# Patient Record
(demographics unavailable — no encounter records)

---

## 2024-10-31 NOTE — HISTORY OF PRESENT ILLNESS
[FreeTextEntry1] : Scheduled for double cataract surgery w/ Dr. Javier Davis on November 11, 2024.  Here for presurgical clearance.  Doing well has no complaints.

## 2024-10-31 NOTE — PHYSICAL EXAM
[General Appearance - Alert] : alert [General Appearance - In No Acute Distress] : in no acute distress [Sclera] : the sclera and conjunctiva were normal [Hearing Threshold Finger Rub Not [place]] : hearing was normal [Jugular Venous Distention Increased] : there was no jugular-venous distention [Thyroid Diffuse Enlargement] : the thyroid was not enlarged [Auscultation Breath Sounds / Voice Sounds] : lungs were clear to auscultation bilaterally [Heart Sounds] : normal S1 and S2 [Murmurs] : no murmurs [Edema] : there was no peripheral edema [Abdomen Tenderness] : non-tender [Cervical Lymph Nodes Enlarged Posterior Bilaterally] : posterior cervical [Cervical Lymph Nodes Enlarged Anterior Bilaterally] : anterior cervical [FreeTextEntry1] : Mild right CVA tenderness [Abnormal Walk] : normal gait [] : no rash [No Focal Deficits] : no focal deficits [Oriented To Time, Place, And Person] : oriented to person, place, and time

## 2024-10-31 NOTE — RESULTS/DATA
[TextEntry] : EKG done today showed NSR.  The interpretation by the algorithm reads septal infarct.  This is due to the position of the leads.  The EKD is IDENTICAL to the one from 2020. No abnormalities.

## 2024-10-31 NOTE — ASSESSMENT
[FreeTextEntry1] : The patient is medically cleared for the upcoming bilateral cataract surgeries.  No medical contraindications.

## 2024-12-06 NOTE — ASSESSMENT
[FreeTextEntry1] : The patient is medically cleared for the second cataract surgery scheduled on December 9, 2024.  No medical contraindications.

## 2024-12-06 NOTE — HISTORY OF PRESENT ILLNESS
[FreeTextEntry1] : Scheduled for double cataract surgery w/ Dr. Javier Davis on December 9, 2024.  Here for presurgical clearance.  Doing well has no complaints.

## 2024-12-06 NOTE — PHYSICAL EXAM
[General Appearance - Alert] : alert [General Appearance - In No Acute Distress] : in no acute distress [Sclera] : the sclera and conjunctiva were normal [Hearing Threshold Finger Rub Not [place]] : hearing was normal [Jugular Venous Distention Increased] : there was no jugular-venous distention [Thyroid Diffuse Enlargement] : the thyroid was not enlarged [Auscultation Breath Sounds / Voice Sounds] : lungs were clear to auscultation bilaterally [Heart Sounds] : normal S1 and S2 [Murmurs] : no murmurs [Edema] : there was no peripheral edema [Abdomen Tenderness] : non-tender [Cervical Lymph Nodes Enlarged Posterior Bilaterally] : posterior cervical [Cervical Lymph Nodes Enlarged Anterior Bilaterally] : anterior cervical [FreeTextEntry1] : Mild right CVA tenderness [Abnormal Walk] : normal gait [] : no rash [No Focal Deficits] : no focal deficits [Oriented To Time, Place, And Person] : oriented to person, place, and time

## 2024-12-06 NOTE — REASON FOR VISIT
[Follow-Up] : a follow-up visit [FreeTextEntry1] : Medical Clearance before cataract surgery scheduled for December 9, 2024.

## 2025-01-08 NOTE — DISCUSSION/SUMMARY
[FreeTextEntry1] : In summary   Pleasant, 67 year old with a past medical history of Hypertension, Hyperlipidemia =============== =============== Bigeminy During Virus - Reassured   HTN - Losartan    Cholesterol management - Assessed - Impression is active; changes as per above - Discussed diet and exercise at length - Any lifestyle/medication changes as per above   Risk factors for cardiomyopathy - Assessed - Impression is stable   Cardiac Health optimization - Discussed diet and exercise at length - Discussed importance of monitoring and re-assessment of cardiac health on further visits      Ortega, kind thanks for the referral.   Yang Joseph MD [facility] RENAY JOHNSON Director, Preventive Cardiology [facility] [place]                                                                                                                                                                                                                                                                     --                                                                                                                                                                                                                                                                                                                                                                                                                                                                                                                                                                                                                                                                                                                            -----------  [EKG obtained to assist in diagnosis and management of assessed problem(s)] : EKG obtained to assist in diagnosis and management of assessed problem(s)

## 2025-01-08 NOTE — HISTORY OF PRESENT ILLNESS
[FreeTextEntry1] : Dear Ortega,   I had the pleasure of seeing your patient JUVENCIO VO for Cardiovascular evaluation.   As you know, she  is a Pleasant, 67 year old with a past medical history of Hypertension, Hyperlipidemia =============== =============== Bigeminy During Virus - Reassured   HTN - Losartan  CC: F/U Heart Issues - Notes No CP/SOB/Palps/Leg swelling - Reports No F/C/N/V/Headaches - Reports Normal Exercise Tolerance - Reports no medication changes - Reports normal mood/quality of life - Reports no diet changes - Reports no body aches - Reports no recent colds/viruses - Recent labs/imaging reviewed including Kane Aragon Risk Assessment for 10 Year ACC/AHA Pooled Risk Cohort Equation places this person at < 7.5% Risk of ASCVD

## 2025-01-09 NOTE — HISTORY OF PRESENT ILLNESS
[FreeTextEntry1] :  Ms. VO is a 67 year old female with  past medical history of Hypertension, Hyperlipidemia, Bigeminy, stroke in 2016 with mild LT sided weakness, Pituitary adenoma which was diagnosed during the work of stroke ( s/p resection in 2016 with no chemo/radiation), Hx of implanted ureters, Hx fluctuating BP related to partial obstruction UPJ -> [place] . She went for routine work up with her PCP and her EKG showed bigeminy and referred to the cardiologist. During the work up, her TTE revealed enlarged aorta. She is currently asymptomatic. She is referred by Dr. Denis Joseph for initial evaluation and management for Thoracic aortic aneurysm.   Patient is doing well and denies recent hospitalization, ER visits, or surgeries. He denies fever, chills, fatigue, headache, blurred vision, dizziness, syncope, chest pain, palpitations, shortness of breath, orthopnea, paroxysmal nocturnal dyspnea, nausea, vomiting, abdominal pain, back pain, headache, visual disturbances, CVA, PE, DVT, D/C, hematochezia, melena, dysuria, hematuria,  BRBPR or swelling to legs.   OFF NOTE: LT arm infiltrate . Patient states that she has orthostatic hypotension from partial obstruction UPJ      NYHA class II

## 2025-01-09 NOTE — CONSULT LETTER
[Dear  ___] : Dear  [unfilled], [Courtesy Letter:] : I had the pleasure of seeing your patient, [unfilled], in my office today. [Please see my note below.] : Please see my note below. [Consult Closing:] : Thank you very much for allowing me to participate in the care of this patient.  If you have any questions, please do not hesitate to contact me. [Sincerely,] : Sincerely, [FreeTextEntry2] : Dr. Denis Joseph [FreeTextEntry3] :  Dr. Akira Colorado Attending Surgeon Department of Cardiovascular and Thoracic surgery [address] [place] [address] Tel: [phone]

## 2025-01-09 NOTE — DATA REVIEWED
[FreeTextEntry1] : 1/8/25 TTE revealed . Left ventricular cavity is normal in size. Left ventricular systolic function is normal with an ejection fraction visually estimated at 60 to 65 %. 2. Normal right ventricular cavity size, with normal wall thickness, and normal right ventricular systolic function. 3. Ascending aorta is aneurysmal, measuring 5.70 cm (indexed 3.58 cm/m). 4. Bicuspid aortic valve. Mild aortic stenosis. 5. Mild to moderate aortic regurgitation.  1/9/25 CTA Chest done, pending report

## 2025-01-09 NOTE — ASSESSMENT
[FreeTextEntry1] : Ms. VO is a 67 year old female with  past medical history of Hypertension, Hyperlipidemia, Bigeminy, stroke in 2016 with mild LT sided weakness, Pituitary adenoma which was diagnosed during the work of stroke ( s/p resection in 2016 with no chemo/radiation), Hx of implanted ureters, Hx fluctuating BP related to partial obstruction UPJ -> [place] . She went for routine work up with her PCP and her EKG showed bigeminy and referred to the cardiologist. During the work up, her TTE revealed enlarged aorta. She is currently asymptomatic. She is referred by Dr. Denis Joseph for initial evaluation and management for Thoracic aortic aneurysm.    Dr. Akira Colorado reviewed the cardiac imaging, medical records and reports with patient and discussed the case.  1/8/25 TTE revealed . Left ventricular cavity is normal in size. Left ventricular systolic function is normal with an ejection fraction visually estimated at 60 to 65 %. 2. Normal right ventricular cavity size, with normal wall thickness, and normal right ventricular systolic function. 3. Ascending aorta is aneurysmal, measuring 5.70 cm (indexed 3.58 cm/m). 4. Bicuspid aortic valve. Mild aortic stenosis. 5. Mild to moderate aortic regurgitation.  1/9/25 CTA Chest done, pending report   Dr. Akira Colorado discussed the risks , benefits and alternatives to surgery. Risks included but not limited to  bleeding , stroke, Myocardial Infarction, kidney problems, Blood transfusion ,permanent  pacemaker implantation,  infections and death. Dr. Akira Colorado quoted a (default value ) operative mortality and complication risks. Dr. Akira Colorado also discussed the various approaches in detail. Dr. Akira Colorado  feel that the patient will benefit and is a candidate for a Ascending aorta, Hemiarch replacement  . All questions and concerns were addressed and patient agrees to proceed with the plan.      Plan: 1) Ascending aorta, Hemiarch replacement  2) Cardiac Catheterization to evaluate coronary arteries 3) Admit one day prior for cardiac cath prior to scheduled surgery date  4) May return to clinic on PRN basis

## 2025-01-09 NOTE — PHYSICAL EXAM
[General Appearance - Alert] : alert [General Appearance - In No Acute Distress] : in no acute distress [General Appearance - Well Nourished] : well nourished [General Appearance - Well Developed] : well developed [Sclera] : the sclera and conjunctiva were normal [PERRL With Normal Accommodation] : pupils were equal in size, round, and reactive to light [Outer Ear] : the ears and nose were normal in appearance [Hearing Threshold Finger Rub Not [place]] : hearing was normal [Both Tympanic Membranes Were Examined] : both tympanic membranes were normal [Neck Appearance] : the appearance of the neck was normal [] : no respiratory distress [Respiration, Rhythm And Depth] : normal respiratory rhythm and effort [Auscultation Breath Sounds / Voice Sounds] : lungs were clear to auscultation bilaterally [Apical Impulse] : the apical impulse was normal [Heart Rate And Rhythm] : heart rate was normal and rhythm regular [Heart Sounds] : normal S1 and S2 [Murmurs] : no murmurs [Examination Of The Chest] : the chest was normal in appearance [2+] : left 2+ [Breast Appearance] : normal in appearance [Bowel Sounds] : normal bowel sounds [Abdomen Soft] : soft [No CVA Tenderness] : no ~M costovertebral angle tenderness [Abnormal Walk] : normal gait [Skin Color & Pigmentation] : normal skin color and pigmentation [Involuntary Movements] : no involuntary movements were seen [Skin Turgor] : normal skin turgor [No Focal Deficits] : no focal deficits [Oriented To Time, Place, And Person] : oriented to person, place, and time [Impaired Insight] : insight and judgment were intact [Affect] : the affect was normal [Mood] : the mood was normal [Memory Recent] : recent memory was not impaired [Memory Remote] : remote memory was not impaired [FreeTextEntry1] : DEFERRED

## 2025-01-09 NOTE — END OF VISIT
[FreeTextEntry3] :  I, MARBELLA Sloan , personally performed the evaluation and management (E/M) services for this new  patient. That E/M includes conducting the initial examination, assessing all conditions, and establishing the plan of care. Today, ASHLY OLIVIA  was here to observe my evaluation and management services for this patient.

## 2025-01-23 NOTE — PHYSICAL EXAM
[PERRL With Normal Accommodation] : pupils were equal in size, round, and reactive to light [Sclera] : the sclera and conjunctiva were normal [Neck Appearance] : the appearance of the neck was normal [Respiration, Rhythm And Depth] : normal respiratory rhythm and effort [Exaggerated Use Of Accessory Muscles For Inspiration] : no accessory muscle use [Auscultation Breath Sounds / Voice Sounds] : lungs were clear to auscultation bilaterally [Apical Impulse] : the apical impulse was normal [Heart Rate And Rhythm] : heart rate was normal and rhythm regular [Heart Sounds] : normal S1 and S2 [Heart Sounds Gallop] : no gallops [Murmurs] : no murmurs [Heart Sounds Pericardial Friction Rub] : no pericardial rub [Examination Of The Chest] : the chest was normal in appearance [Chest Visual Inspection Thoracic Asymmetry] : no chest asymmetry [Diminished Respiratory Excursion] : normal chest expansion [2+] : left 2+ [Abnormal Walk] : normal gait [Involuntary Movements] : no involuntary movements were seen [Nail Clubbing] : no clubbing  or cyanosis of the fingernails [Skin Color & Pigmentation] : normal skin color and pigmentation [Skin Turgor] : normal skin turgor [] : no rash [FreeTextEntry1] : CT sites x4 with sutures clean, dry, and intact. No LE edema.  [No Focal Deficits] : no focal deficits [Oriented To Time, Place, And Person] : oriented to person, place, and time [Impaired Insight] : insight and judgment were intact [Affect] : the affect was normal [Mood] : the mood was normal [Memory Recent] : recent memory was not impaired [Memory Remote] : remote memory was not impaired

## 2025-01-23 NOTE — PLAN
[TextEntry] : Post Operative Care:  Pt advised of importance of daily weights. Pt advised to call [facility] NP with weight gain of 3 lbs or more.  Pt instructed on how to use incentive spirometer every hour, demonstrated proper use.  Pt encouraged to ambulate as much as tolerated, avoiding extreme temperatures outdoors.  Also advised to cleanse incisions daily with mild soap and water and to avoid lotions, powders, ointments or creams near or on the incision.  Low salt, low fat diet encouraged and discussed.  Pt advised to avoid heavy lifting or straining.     Follow Your Heart team will continue to follow up with pt status.  NP/CCC roles explained with pt understanding, contact information provided. Pt agrees to call with any questions, issues or concerns.  Worsening symptoms reviewed with patient understanding.      FOLLOW UP APPOINTMENTS:  CTS: Dr. Colorado appointment 1/30/25  CARDIOLOGIST: 3 weeks  PCP: Pt encouraged to follow up within one month of discharge

## 2025-01-23 NOTE — ASSESSMENT
[FreeTextEntry1] : S/p Ascending hemiarch replacement- continue Asa, Furosemide, and Spironolactone.

## 2025-01-23 NOTE — HISTORY OF PRESENT ILLNESS
[Dyslipidemia] : Dyslipidemia Female [Hypertension] : Hypertension [Prior CVA] : with prior CVA  [Remote (>= 2 wks)] : in remote past, greater than 2 wks ago [FreeTextEntry1] : 67 year old female with PMHx of HTN, HLD, Bigeminy, Stroke in 2016 with mild left sided hemiparesis, pituitary adenoma presented to her PCP for a routine work up, was found to have bigeminy on her EKG and subsequently referred to a cardiologist. During her work up, TTE revealed enlarged aorta for which she was then referred to Dr. Denis Joseph for initial evaluation and management for thoracic aortic aneurysm. Patient now here for [facility] w/ Dr. Trevino today and will be admitted post cath for ascending aortic, hemiarch replacement surgery with Dr. Colorado. Currently denies chest pain, palpitations, SOB, dizziness. 1/16/25 S/p cardiac cath, non-obstructive. 1/17: Ascending hemiarch replacement with Dr. Colorado. ++Blood products in the OR and CTU post op 1/18 Extubated  ---> on Room Air. Per CTU, cannot take Oxy: increase prakash and toradol x 2d, ambulating 1/19 Report per CTU: Overnight - bradycardia paced at 80 --- s/p Albumin 1 PRBC -- lactate cleared. keep paced for now. R brachial A-line out in CTU. TRANSFERRED TO SDU with MED Chest tubes x 3, PW --> EPM 40/10. Medication regimen maintained. Monitor Chest tube outputs. Encouraged OOB/increase ambulation as tolerated. 1/20 C/o incisional pain.  Will discuss removing ct's, minimal drainage. 1/21  CT x 2 d/c yesterday.  pw A cut , V pulled, plan to d/c ct later today. 1/22 VSS rounds made w/ Dr. Acosta - pt cleared for d/c home on 1 wk diuretics Ms. Correa is recovering at home, ambulating independently.  Pt verbalized she slept well, has a good appetite, and incisional pain is controlled with prn Acetaminophen.   Pt verbalized occasional cough with clear phlegm and is using incentive spirometer regularly.  [facility] to initiate care later this week.

## 2025-01-27 NOTE — PLAN
[TextEntry] : Post Operative Care:  Pt advised of importance of daily weights.  Pt instructed on how to use incentive spirometer every hour, demonstrated proper use.  Pt encouraged to ambulate as much as tolerated, avoiding extreme temperatures outdoors.  Also advised to cleanse incisions daily with mild soap and water and to avoid lotions, powders, ointments or creams near or on the incision.  Low salt, low fat diet encouraged and discussed.  Pt advised to avoid heavy lifting or straining.     Follow Your Heart team will continue to follow up with pt status.  NP/CCC roles explained with pt understanding, contact information provided. Pt agrees to call with any questions, issues or concerns.  Worsening symptoms reviewed with patient understanding.      FOLLOW UP APPOINTMENTS:  CTS: Dr. Colorado appointment 1/28/25  CARDIOLOGIST: Dr. Carbajal 1/28/25  PCP: Pt encouraged to follow up within one month of discharge

## 2025-01-27 NOTE — PHYSICAL EXAM
[Sclera] : the sclera and conjunctiva were normal [PERRL With Normal Accommodation] : pupils were equal in size, round, and reactive to light [Neck Appearance] : the appearance of the neck was normal [Respiration, Rhythm And Depth] : normal respiratory rhythm and effort [Exaggerated Use Of Accessory Muscles For Inspiration] : no accessory muscle use [Auscultation Breath Sounds / Voice Sounds] : lungs were clear to auscultation bilaterally [Apical Impulse] : the apical impulse was normal [Heart Rate And Rhythm] : heart rate was normal and rhythm regular [Heart Sounds] : normal S1 and S2 [Heart Sounds Gallop] : no gallops [Murmurs] : no murmurs [Heart Sounds Pericardial Friction Rub] : no pericardial rub [Examination Of The Chest] : the chest was normal in appearance [Chest Visual Inspection Thoracic Asymmetry] : no chest asymmetry [Diminished Respiratory Excursion] : normal chest expansion [2+] : left 2+ [Bowel Sounds] : normal bowel sounds [Abdomen Soft] : soft [Abdomen Tenderness] : non-tender [Abnormal Walk] : normal gait [Nail Clubbing] : no clubbing  or cyanosis of the fingernails [Involuntary Movements] : no involuntary movements were seen [Skin Color & Pigmentation] : normal skin color and pigmentation [Skin Turgor] : normal skin turgor [] : no rash [FreeTextEntry1] : CT sites x4, covered with gauze and silk tape. Dressing removed. Suture present to left CT site- clean, dry, and intact. Slight erythema to right most CT site, no discharge noted from CT sites. No LE edema.  [No Focal Deficits] : no focal deficits [Oriented To Time, Place, And Person] : oriented to person, place, and time [Impaired Insight] : insight and judgment were intact [Affect] : the affect was normal [Mood] : the mood was normal [Memory Recent] : recent memory was not impaired [Memory Remote] : remote memory was not impaired

## 2025-01-27 NOTE — REASON FOR VISIT
[Follow-Up: _____] : a [unfilled] follow-up visit [Family Member] : family member [FreeTextEntry2] : 1/17/25

## 2025-01-27 NOTE — ASSESSMENT
[FreeTextEntry1] : S/p Ascending hemiarch replacement- continue Aspirin daily.  Fever- slight erythema to CT site, start Cephalexin 500 mg po QID. Pt advised to call [facility] NP if fever reoccurs.

## 2025-01-27 NOTE — HISTORY OF PRESENT ILLNESS
[FreeTextEntry1] : 67 year old female with PMHx of HTN, HLD, Bigeminy, Stroke in 2016 with mild left sided hemiparesis, pituitary adenoma presented to her PCP for a routine work up, was found to have bigeminy on her EKG and subsequently referred to a cardiologist. During her work up, TTE revealed enlarged aorta for which she was then referred to Dr. Denis Joseph for initial evaluation and management for thoracic aortic aneurysm. Patient now here for [facility] w/ Dr. Trevino today and will be admitted post cath for ascending aortic, hemiarch replacement surger with Dr. oClorado. Currently denies chest pain, palpitations, SOB, dizziness. 1/16/25 S/p cardiac cath, non-obstructive. 1/17: Ascending hemiarch replacement with Dr. Colorado ++Blood products in the OR and CTU post op 1/18 Extubated  ---> on Room Air. Per CTU, cannot take Oxy: increase prakash and toradol x 2d ambulating 1/19 Report per CTU: Overnight - bradycardiac paced at 80 --- s/p Albumin 1 PRBC -- lactate cleared. keep paced for now. R brachial A-line out in CTU. TRANSFERRED TO SDU with MED Chest tubes x 3, PW --> EPM 40/10. Medication regimen maintained. Monitor Chest tube outputs. Encouraged OOB/increase ambulation as tolerated. 1/20 C/o incisional pain.  Will discuss removing ct's, minimal drainage. 1/21  CT x 2 d/c yesterday.  pw A cut , V pulled, plan to d/c ct later today. 1/22 VSS rounds made w/ Dr. Acosta - pt cleared for d/c home on 1 wk diuretics.   Ms. Correa is seen at home, her daughter is present during visit.  Ms. Correa verbalized fever to 102 degrees on Saturday, temperature 99 since then.  She c/o some light yellowish discharge from CT site earlier today. Pt denies drainage from mid sternal incision.  Pt c/o migraine, possibly r/t to Acetaminophen use. No alleviating factors.  Pt denies cough, SOB, incisional pain, abdominal pain, dysuria or urinary hesitancy, and LE swelling.  Weight stable at 115 lbs, has not taken Furosemide or Spironolactone since 1/23/25, have leg cramps and lost 5 lbs in 1 day. Pt is currently at her dry weight.  [facility] initiated care over the weekend.    [Dyslipidemia] : Dyslipidemia [Hypertension] : Hypertension

## 2025-01-28 NOTE — DISCUSSION/SUMMARY
[FreeTextEntry1] : In summary   Rizwan, 67 year old with a past medical history of Hypertension, Hyperlipidemia =============== =============== Bigeminy During Virus - Reassured   HTN - Losartan  Night sweats +Fever  Check CXR UA BCxs Incision site and as per CTS  Cholesterol management - Assessed - Impression is active; changes as per above - Discussed diet and exercise at length - Any lifestyle/medication changes as per above   Risk factors for cardiomyopathy - Assessed - Impression is stable   Cardiac Health optimization - Discussed diet and exercise at length - Discussed importance of monitoring and re-assessment of cardiac health on further visits      Ortega, kind thanks for the referral.   Yang Joseph MD [facility] RENAY JOHNSON Director, Preventive Cardiology [facility] Cardi[facility] [place]                                                                                                                                                                                                                                                                     --                                                                                                                                                                                                                                                                                                                                                                                                                                                                                                                                                                                                                                                                                                                                            40 minutes spent in patient encounter explaining and formulating rationale for treatment plan. >50% of time spent in direct counseling reviewing all tests, labs, and imaging and conferring with patient, family member, and other physicians regarding patient care

## 2025-01-28 NOTE — HISTORY OF PRESENT ILLNESS
[FreeTextEntry1] : Dear Ortega,   I had the pleasure of seeing your patient JUVENCIO VO for Cardiovascular evaluation.   As you know, she  is a Pleasant, 67 year old with a past medical history of Hypertension, Hyperlipidemia =============== =============== Bigeminy During Virus - Reassured   HTN - Losartan  CC: F/U Heart Issues - Notes No CP/SOB/Palps/Leg swelling - Reports No F/C/N/V/Headaches - Reports Normal Exercise Tolerance - Reports no medication changes - Reports normal mood/quality of life - Reports no diet changes - Reports no body aches - Reports no recent colds/viruses - Recent labs/imaging reviewed including Kane Aragon Risk Assessment for 10 Year ACC/AHA Pooled Risk Cohort Equation places this person at < 7.5% Risk of ASCVD ----------------- ----------------- 1-2025 CC: Fever and Heart Issues - Notes +CP, Fever - Reports No F/C/N/V/Headaches - Reports Normal Exercise Tolerance - Reports no medication changes - Reports normal mood/quality of life - Reports no diet changes - Reports no body aches - Reports no recent colds/viruses - Recent labs/imaging reviewed - Relevant Family history reviewed Mother/Father - CV Night sweats +Fever  Check CXR UA BCxs Incision site and as per CTS

## 2025-01-28 NOTE — PHYSICAL EXAM

## 2025-01-28 NOTE — PHYSICAL EXAM

## 2025-01-28 NOTE — DISCUSSION/SUMMARY
[FreeTextEntry1] : In summary   Rizwan, 67 year old with a past medical history of Hypertension, Hyperlipidemia =============== =============== Bigeminy During Virus - Reassured   HTN - Losartan  Night sweats +Fever  Check CXR UA BCxs Incision site and as per CTS  Cholesterol management - Assessed - Impression is active; changes as per above - Discussed diet and exercise at length - Any lifestyle/medication changes as per above   Risk factors for cardiomyopathy - Assessed - Impression is stable   Cardiac Health optimization - Discussed diet and exercise at length - Discussed importance of monitoring and re-assessment of cardiac health on further visits      Ortega, kind thanks for the referral.   Yang Joseph MD [facility] RENAY JOHNSON Director, Preventive Cardiology [facility] Cardiovascular [place]                                                                                                                                                                                                                                                                     --                                                                                                                                                                                                                                                                                                                                                                                                                                                                                                                                                                                                                                                                                                                                            40 minutes spent in patient encounter explaining and formulating rationale for treatment plan. >50% of time spent in direct counseling reviewing all tests, labs, and imaging and conferring with patient, family member, and other physicians regarding patient care

## 2025-01-29 NOTE — ASSESSMENT
[FreeTextEntry1] : 67 year old female with PMHx of HTN, HLD, Bigeminy, Stroke in 2016 with mild left sided hemiparesis, pituitary adenoma presented to her PCP for a routine work up, was found to have bigeminy on her EKG and subsequently referred to a cardiologist. During her work up, TTE revealed enlarged aorta for which she was then referred to Dr. Denis Joseph for initial evaluation and management for thoracic aortic aneurysm. Patient now here for [facility] w/ Dr. Trevino today and will be admitted post cath for ascending aortic, hemiarch replacement surgery with Dr. Colorado. 1/16/25 S/p cardiac cath, non-obstructive.  1/17: Ascending hemiarch replacement with Dr. Colorado. ++Blood products in the OR and CTU post op,  increase prakash and toradol x 2d, 1/19 Report per CTU: Overnight - bradycardiac paced at 80 --- s/p Albumin 1 PRBC -- lactate cleared. keep paced for now. R brachial A-line out in CTU.1/20 C/o incisional pain.  Will discuss removing ct's, minimal drainage. d/c home on 1 wk diuretics  This visit, she complaints of fever and chills only at night since Thursday up to 102 F. She started on Keflex yesterday . Denies any chest pain, shortness of breath, palpitations, dizziness or pedal edema.  Today on exam bilateral lung fields are clear, no wheezing or rales, no use of accessory muscles noted or respiratory distress, normal sinus rhythm, sternum stable, incision clean, dry and intact.   No peripheral edema noted. Sutures removed today.    Instructed patient on importance of optimal glycemic control, daily showering, daily weights, any signs of fever (temperature greater than 101F, chills,  incentive spirometer use, and increase ambulation as tolerated. Instructed to call office with any signs or symptoms of infection or weight gain of 2 or more pounds in 1 day or 3 or more pounds in 1 week.    Discussed intake of plant based foods, including vegetables, fruits, and whole grain foods: legumes, nuts and seeds, fish or seafood, lean meats, and non-fat or low-fat diary foods. Plant based oils (non-tropical) in place of solid fats. Instructed patient to limit intake of high fat meats and processed meats, high-fat diary foods, dietary cholesterol and sodium, foods and beverages with added sugars.     ******************************************************************************************************************************************************************************************************************************************************************************************************************************************************************************************************************************************************************************************************************************************************************************************************************************************************************************************************************************************************************************************************************      67F h/o HTN, HLD who presents today for initial evaluation regarding her incidentally found aortic aneurysm on routine surveillance TTE. No previous TTE or CT imaging to compare to. TTE on 1/8/2025 revealed ~57mm ascending aortic aneurysm w/bicuspid aortic valve. Normal biventricular function w/mild aortic stenosis and trace to no aortic insufficiency. Currently denies any chest / back pain or palpitations. Denies dyspnea at rest or upon exertion. No known family history of aortic aneurysmal disease, cerebral aneurysm disease or acute aortic syndrome.       - We are extremely pleased with the functional recovery of Ms. Correa. Her chest incisions are pristine. She has no signs of lower extremity edema, and was on a week of PO diuretics. Her exercise capacity has gradually improved; she ambulates without any support. Her chest discomfort is resolving. Her appetite is still improving. She is breathing well at rest and on exertion. She described some nocturnal fevers over the last several days, last night the first true fever ~102.0  She is afebrile in the office and states that during the day she feels excellent. She was started on Keflex by her outpatient physician.    1. Ms. Correa looks excellent. We will continue her course of antibiotics.   2. We obtained a CXR, which is pristine. No signs of infiltrations or infection / effusions / pneumothoraces.   3. We ordered basic set of labs [CBC, CMP, UA, Ucx, Blood Cx x 2]; she has a normal white count. Her U/A is negative. Her only abnormal lave value is essentially an isolated elevation of her alkaline phosphatase, which she had prior to discharge. She had a negative RUQ US prior to her discharge, which just showed some evidence of cholelithiasis. We will have her follow up with her PCP / GI.   4. She should continue to increase her activity and walk daily as tolerated. Keep legs elevated above heart when resting/sitting/sleeping.  5. She should continue to follow up with her PCP as well as her cardiologist [Dr. Denis Joseph], I will relay my findings to Dr. Joseph.   6. She will return to clinic in two weeks for a follow up visit.

## 2025-01-29 NOTE — DISCUSSION/SUMMARY
[FreeTextEntry1] : In summary   Rizwan, 67 year old with a past medical history of Hypertension, Hyperlipidemia =============== =============== Bigeminy During Virus - Reassured   HTN - Losartan  Night sweats +Fever  Check CXR UA BCxs Incision site and as per CTS  Cholesterol management - Assessed - Impression is active; changes as per above - Discussed diet and exercise at length - Any lifestyle/medication changes as per above   Risk factors for cardiomyopathy - Assessed - Impression is stable   Cardiac Health optimization - Discussed diet and exercise at length - Discussed importance of monitoring and re-assessment of cardiac health on further visits      Ortega, kind thanks for the referral.   Yang Joseph MD [facility] RENAY JOHNSON Director, Preventive Cardiology [facility] Cardiovascular [place]                                                                                                                                                                                                                                                                     --                                                                                                                                                                                                                                                                                                                                                                                                                                                                                                                                                                                                                                                                                                                                            40 minutes spent in patient encounter explaining and formulating rationale for treatment plan. >50% of time spent in direct counseling reviewing all tests, labs, and imaging and conferring with patient, family member, and other physicians regarding patient care

## 2025-01-29 NOTE — REASON FOR VISIT
[Family Member] : family member [de-identified] :  Ascending aortic replacement and transverse aortic hemiarch replacement under deep hypothermic circulatory arrest with retrograde cerebral perfusion  using a 30 mm Dacron Ante Trey graft for enlarging ascending aortic aneurysm.

## 2025-01-29 NOTE — PHYSICAL EXAM
[Well Developed] : well developed [Well Nourished] : well nourished [No Acute Distress] : no acute distress [Normal Conjunctiva] : normal conjunctiva [Normal Venous Pressure] : normal venous pressure [No Carotid Bruit] : no carotid bruit [Normal S1, S2] : normal S1, S2 [No Rub] : no rub [No Gallop] : no gallop [Clear Lung Fields] : clear lung fields [Good Air Entry] : good air entry [No Respiratory Distress] : no respiratory distress  [Soft] : abdomen soft [Non Tender] : non-tender [No Masses/organomegaly] : no masses/organomegaly [Normal Bowel Sounds] : normal bowel sounds [Normal Gait] : normal gait [No Edema] : no edema [No Cyanosis] : no cyanosis [No Clubbing] : no clubbing [No Varicosities] : no varicosities [No Rash] : no rash [No Skin Lesions] : no skin lesions [Moves all extremities] : moves all extremities [No Focal Deficits] : no focal deficits [Normal Speech] : normal speech [Alert and Oriented] : alert and oriented [Normal memory] : normal memory [de-identified] : +Sternotomy

## 2025-01-29 NOTE — REASON FOR VISIT
[Family Member] : family member [de-identified] :  Ascending aortic replacement and transverse aortic hemiarch replacement under deep hypothermic circulatory arrest with retrograde cerebral perfusion  using a 30 mm Dacron Ante Trey graft for enlarging ascending aortic aneurysm.

## 2025-01-29 NOTE — ASSESSMENT
[FreeTextEntry1] : 67 year old female with PMHx of HTN, HLD, Bigeminy, Stroke in 2016 with mild left sided hemiparesis, pituitary adenoma presented to her PCP for a routine work up, was found to have bigeminy on her EKG and subsequently referred to a cardiologist. During her work up, TTE revealed enlarged aorta for which she was then referred to Dr. Denis Joseph for initial evaluation and management for thoracic aortic aneurysm. Patient now here for [facility] w/ Dr. Trevino today and will be admitted post cath for ascending aortic, hemiarch replacement surgery with Dr. Colorado. 1/16/25 S/p cardiac cath, non-obstructive.  1/17: Ascending hemiarch replacement with Dr. Colorado. ++Blood products in the OR and CTU post op,  increase prakash and toradol x 2d, 1/19 Report per CTU: Overnight - bradycardiac paced at 80 --- s/p Albumin 1 PRBC -- lactate cleared. keep paced for now. R brachial A-line out in CTU.1/20 C/o incisional pain.  Will discuss removing ct's, minimal drainage. d/c home on 1 wk diuretics  This visit, she complaints of fever and chills only at night since Thursday up to 102 F. She started on Keflex yesterday . Denies any chest pain, shortness of breath, palpitations, dizziness or pedal edema.  Today on exam bilateral lung fields are clear, no wheezing or rales, no use of accessory muscles noted or respiratory distress, normal sinus rhythm, sternum stable, incision clean, dry and intact.   No peripheral edema noted. Sutures removed today.    Instructed patient on importance of optimal glycemic control, daily showering, daily weights, any signs of fever (temperature greater than 101F, chills,  incentive spirometer use, and increase ambulation as tolerated. Instructed to call office with any signs or symptoms of infection or weight gain of 2 or more pounds in 1 day or 3 or more pounds in 1 week.    Discussed intake of plant based foods, including vegetables, fruits, and whole grain foods: legumes, nuts and seeds, fish or seafood, lean meats, and non-fat or low-fat diary foods. Plant based oils (non-tropical) in place of solid fats. Instructed patient to limit intake of high fat meats and processed meats, high-fat diary foods, dietary cholesterol and sodium, foods and beverages with added sugars.     ******************************************************************************************************************************************************************************************************************************************************************************************************************************************************************************************************************************************************************************************************************************************************************************************************************************************************************************************************************************************************************************************************************      67F h/o HTN, HLD who presents today for initial evaluation regarding her incidentally found aortic aneurysm on routine surveillance TTE. No previous TTE or CT imaging to compare to. TTE on 1/8/2025 revealed ~57mm ascending aortic aneurysm w/bicuspid aortic valve. Normal biventricular function w/mild aortic stenosis and trace to no aortic insufficiency. Currently denies any chest / back pain or palpitations. Denies dyspnea at rest or upon exertion. No known family history of aortic aneurysmal disease, cerebral aneurysm disease or acute aortic syndrome.       - We are extremely pleased with the functional recovery of Ms. Correa. Her chest incisions are pristine. She has no signs of lower extremity edema, and was on a week of PO diuretics. Her exercise capacity has gradually improved; she ambulates without any support. Her chest discomfort is resolving. Her appetite is still improving. She is breathing well at rest and on exertion. She described some nocturnal fevers over the last several days, last night the first true fever ~102.0  She is afebrile in the office and states that during the day she feels excellent. She was started on Keflex by her outpatient physician.    1. Ms. Correa looks excellent. We will continue her course of antibiotics.   2. We obtained a CXR, which is pristine. No signs of infiltrations or infection / effusions / pneumothoraces.   3. We ordered basic set of labs [CBC, CMP, UA, Ucx, Blood Cx x 2]; she has a normal white count. Her U/A is negative. Her only abnormal lave value is essentially an isolated elevation of her alkaline phosphatase, which she had prior to discharge. She had a negative RUQ US prior to her discharge, which just showed some evidence of cholelithiasis. We will have her follow up with her PCP / GI.   4. She should continue to increase her activity and walk daily as tolerated. Keep legs elevated above heart when resting/sitting/sleeping.  5. She should continue to follow up with her PCP as well as her cardiologist [Dr. Denis Joseph], I will relay my findings to Dr. Joseph.   6. She will return to clinic in two weeks for a follow up visit.

## 2025-01-29 NOTE — REASON FOR VISIT
[Family Member] : family member [de-identified] :  Ascending aortic replacement and transverse aortic hemiarch replacement under deep hypothermic circulatory arrest with retrograde cerebral perfusion  using a 30 mm Dacron Ante Trey graft for enlarging ascending aortic aneurysm.

## 2025-01-29 NOTE — ASSESSMENT
[FreeTextEntry1] : 67 year old female with PMHx of HTN, HLD, Bigeminy, Stroke in 2016 with mild left sided hemiparesis, pituitary adenoma presented to her PCP for a routine work up, was found to have bigeminy on her EKG and subsequently referred to a cardiologist. During her work up, TTE revealed enlarged aorta for which she was then referred to Dr. Denis Joseph for initial evaluation and management for thoracic aortic aneurysm. Patient now here for [facility] w/ Dr. Trevino today and will be admitted post cath for ascending aortic, hemiarch replacement surgery with Dr. Colorado. 1/16/25 S/p cardiac cath, non-obstructive.  1/17: Ascending hemiarch replacement with Dr. Colorado. ++Blood products in the OR and CTU post op,  increase prakash and toradol x 2d, 1/19 Report per CTU: Overnight - bradycardiac paced at 80 --- s/p Albumin 1 PRBC -- lactate cleared. keep paced for now. R brachial A-line out in CTU.1/20 C/o incisional pain.  Will discuss removing ct's, minimal drainage. d/c home on 1 wk diuretics  This visit, she complaints of fever and chills only at night since Thursday up to 102 F. She started on Keflex yesterday . Denies any chest pain, shortness of breath, palpitations, dizziness or pedal edema.  Today on exam bilateral lung fields are clear, no wheezing or rales, no use of accessory muscles noted or respiratory distress, normal sinus rhythm, sternum stable, incision clean, dry and intact.   No peripheral edema noted. Sutures removed today.    Instructed patient on importance of optimal glycemic control, daily showering, daily weights, any signs of fever (temperature greater than 101F, chills,  incentive spirometer use, and increase ambulation as tolerated. Instructed to call office with any signs or symptoms of infection or weight gain of 2 or more pounds in 1 day or 3 or more pounds in 1 week.    Discussed intake of plant based foods, including vegetables, fruits, and whole grain foods: legumes, nuts and seeds, fish or seafood, lean meats, and non-fat or low-fat diary foods. Plant based oils (non-tropical) in place of solid fats. Instructed patient to limit intake of high fat meats and processed meats, high-fat diary foods, dietary cholesterol and sodium, foods and beverages with added sugars.     ******************************************************************************************************************************************************************************************************************************************************************************************************************************************************************************************************************************************************************************************************************************************************************************************************************************************************************************************************************************************************************************************************************      67F h/o HTN, HLD who presents today for initial evaluation regarding her incidentally found aortic aneurysm on routine surveillance TTE. No previous TTE or CT imaging to compare to. TTE on 1/8/2025 revealed ~57mm ascending aortic aneurysm w/bicuspid aortic valve. Normal biventricular function w/mild aortic stenosis and trace to no aortic insufficiency. Currently denies any chest / back pain or palpitations. Denies dyspnea at rest or upon exertion. No known family history of aortic aneurysmal disease, cerebral aneurysm disease or acute aortic syndrome.       - We are extremely pleased with the functional recovery of Ms. Correa. Her chest incisions are pristine. She has no signs of lower extremity edema, and was on a week of PO diuretics. Her exercise capacity has gradually improved; she ambulates without any support. Her chest discomfort is resolving. Her appetite is still improving. She is breathing well at rest and on exertion. She described some nocturnal fevers over the last several days, last night the first true fever ~102.0  She is afebrile in the office and states that during the day she feels excellent. She was started on Keflex by her outpatient physician.    1. Ms. Correa looks excellent. We will continue her course of antibiotics.   2. We obtained a CXR, which is pristine. No signs of infiltrations or infection / effusions / pneumothoraces.   3. We ordered basic set of labs [CBC, CMP, UA, Ucx, Blood Cx x 2]; she has a normal white count. Her U/A is negative. Her only abnormal lave value is essentially an isolated elevation of her alkaline phosphatase, which she had prior to discharge. She had a negative RUQ US prior to her discharge, which just showed some evidence of cholelithiasis. We will have her follow up with her PCP / GI.   4. She should continue to increase her activity and walk daily as tolerated. Keep legs elevated above heart when resting/sitting/sleeping.  5. She should continue to follow up with her PCP as well as her cardiologist [Dr. Denis Joseph], I will relay my findings to Dr. Joseph.   6. She will return to clinic in two weeks for a follow up visit.

## 2025-01-30 NOTE — REASON FOR VISIT
[Follow-Up] : a follow-up visit [Family Member] : family member [FreeTextEntry1] : Requested follow up to discuss migraines and low grade fever.

## 2025-01-30 NOTE — ASSESSMENT
[FreeTextEntry1] : 1. Postop fever w/ normal urine culture and Chest Xray.  Rigors suggesting a bacterial infection and the high Alkaline phosphatase w/ slightly elevates liver enzymes suggest cholecystitis, not uncommon in the postop period.  The patient seems to have responded to Keflex. We discussed to finish the Keflex and to repeat labs including LFTs next week. Will call the patient when results are available.  2. Recurrent migraines. [facility] to attempt to move up the appointment.

## 2025-01-30 NOTE — HISTORY OF PRESENT ILLNESS
[FreeTextEntry1] : JUVENCIO VO is status post Ascending aortic replacement and transverse aortic hemiarch replacement under deep hypothermic circulatory arrest with retrograde cerebral perfusion using a 30 mm Dacron Ante Trey graft for enlarging ascending aortic aneurysm. and she is here for a post-op visit.  During hospitalization following the surgery she experienced migraines (with auras and visual disturbances). She was seen at an urgent care center and given Benadryl and Metoclopramide. [facility] second week of February.  The other issue is fever which started the day after returning home w/ occasional shacking chills. She has negative urine culture and chest Xray.  No respiratory symptoms. Some nausea w/ the headaches.  Of note, preop she had normal alkaline phosphatase and LFTs. Her alkaline phosphatase went up to 200 postop and a gallbladder US showed cholelithiasis but no inflammatory changes. This was done over one week ago.  Of interest, the cardiac RN showed some minimal drainage from the stiches of the abdominal wound (where the drainages were) and prescribed Keflex 500 mg 4 times per day.  She started the antibiotic on Monday and of last night she has had no fever.  At present her only issue is the migraines.

## 2025-02-04 NOTE — HISTORY OF PRESENT ILLNESS
[Dyslipidemia] : Dyslipidemia [FreeTextEntry1] : 67 year old female with PMHx of HTN, HLD, Bigeminy, Stroke in 2016 with mild left sided hemiparesis, pituitary adenoma presented to her PCP for a routine work up, was found to have bigeminy on her EKG and subsequently referred to a cardiologist. During her work up, TTE revealed enlarged aorta for which she was then referred to Dr. Denis Joseph for initial evaluation and management for thoracic aortic aneurysm. Patient now here for [facility] w/ Dr. Trevino today and will be admitted post cath for ascending aortic, hemiarch replacement surgery with Dr. Colorado. Currently denies chest pain, palpitations, SOB, dizziness. 1/16/25 S/p cardiac cath, non-obstructive. 1/17: Ascending hemiarch replacement with Dr. Colorado. ++Blood products in the OR and CTU post op 1/18 Extubated  ---> on Room Air Per CTU, cannot take Oxy: increase prakash and toradol x 2d ambulating 1/19 Report per CTU: Overnight - bradycardiac paced at 80 --- s/p Albumin 1 PRBC -- lactate cleared. keep paced for now. R brachial A-line out in CTU. TRANSFERRED TO SDU with MED Chest tubes x 3, PW --> EPM 40/10. Medication regimen maintained. Monitor Chest tube outputs. Encouraged OOB/increase ambulation as tolerated. 1/20 C/o incisional pain.  Will discuss removing ct's, minimal drainage. 1/21  CT x 2 d/c yesterday.  pw A cut , V pulled, plan to d/c ct later today. 1/22 VSS rounds made w/ Dr. Acosta - pt cleared for d/c home on 1 wk diuretics.  Ms. Correa is recovering at home; her daughter is present during today's visit.  Pt with c/o concern for infection to bottom of sternal incision, pt verbalized itchy rash, denies drainage from incision. Pt verbalized temperature of  over past few days. Pt c/o migraines daily to twice daily, no alleviating or aggravating symptoms with migraine.  Pt denies dizziness, SOB, palpitations.  She is still taking Cephalexin, prescription extended by PCP.

## 2025-02-04 NOTE — HISTORY OF PRESENT ILLNESS
[Dyslipidemia] : Dyslipidemia [FreeTextEntry1] : 67 year old female with PMHx of HTN, HLD, Bigeminy, Stroke in 2016 with mild left sided hemiparesis, pituitary adenoma presented to her PCP for a routine work up, was found to have bigeminy on her EKG and subsequently referred to a cardiologist. During her work up, TTE revealed enlarged aorta for which she was then referred to Dr. Denis Joseph for initial evaluation and management for thoracic aortic aneurysm. Patient now here for [facility] w/ Dr. Trevino today and will be admitted post cath for ascending aortic, hemiarch replacement surgery with Dr. Colorado. Currently denies chest pain, palpitations, SOB, dizziness. 1/16/25 S/p cardiac cath, non-obstructive. 1/17: Ascending hemiarch replacement with Dr. Colorado. ++Blood products in the OR and CTU post op 1/18 Extubated  ---> on Room Air Per CTU, cannot take Oxy: increase prakash and toradol x 2d ambulating 1/19 Report per CTU: Overnight - bradycardiac paced at 80 --- s/p Albumin 1 PRBC -- lactate cleared. keep paced for now. R brachial A-line out in CTU. TRANSFERRED TO SDU with MED Chest tubes x 3, PW --> EPM 40/10. Medication regimen maintained. Monitor Chest tube outputs. Encouraged OOB/increase ambulation as tolerated. 1/20 C/o incisional pain.  Will discuss removing ct's, minimal drainage. 1/21  CT x 2 d/c yesterday.  pw A cut , V pulled, plan to d/c ct later today. 1/22 VSS rounds made w/ Dr. Acosta - pt cleared for d/c home on 1 wk diuretics.  Ms. Correa is recovering at home; her daughter is present during today's visit.  Pt with c/o concern for infection to bottom of sternal incision, pt verbalized itchy rash, denies drainage from incision. Pt verbalized temperature of  over past few days. Pt c/o migraines daily to twice daily, no alleviating or aggravating symptoms with migraine.  Pt denies dizziness, SOB, palpitations.  She is still taking Cephalexin, prescription extended by PCP.

## 2025-02-04 NOTE — PLAN
[TextEntry] : Post Operative Care:  Pt advised of importance of daily weights.  Pt instructed on how to use incentive spirometer every hour, demonstrated proper use.  Pt encouraged to ambulate as much as tolerated, avoiding extreme temperatures outdoors.  Also advised to cleanse incisions daily with mild soap and water and to avoid lotions, powders, ointments or creams near or on the incision.  Low salt, low fat diet encouraged and discussed.  Pt advised to avoid heavy lifting or straining.     [facility] team will continue to follow up with pt status.  NP/CCC roles explained with pt understanding, contact information provided. Pt agrees to call with any questions, issues or concerns.  Worsening symptoms reviewed with patient understanding.      FOLLOW UP APPOINTMENTS:  CTS: Dr. Colorado appointment 2/13/25  CARDIOLOGIST: Dr. Carbajal 2/28/25 Neurology appointment 2/11/25 PCP: Pt encouraged to follow up within one month of discharge

## 2025-02-04 NOTE — ASSESSMENT
[FreeTextEntry1] : S/p Ascending hemiarch replacement- continue Asa. Rash- dermatitis, avoid creams and lotions to all incisions, continue to shower with antibacterial soap. Benadryl 25 mg po q 6 hours prn pruritus.  Can continue Cephalexin as prescribed.

## 2025-02-04 NOTE — PHYSICAL EXAM
[Sclera] : the sclera and conjunctiva were normal [PERRL With Normal Accommodation] : pupils were equal in size, round, and reactive to light [Neck Appearance] : the appearance of the neck was normal [] : no respiratory distress [Respiration, Rhythm And Depth] : normal respiratory rhythm and effort [Exaggerated Use Of Accessory Muscles For Inspiration] : no accessory muscle use [Auscultation Breath Sounds / Voice Sounds] : lungs were clear to auscultation bilaterally [Apical Impulse] : the apical impulse was normal [Heart Rate And Rhythm] : heart rate was normal and rhythm regular [Heart Sounds] : normal S1 and S2 [Heart Sounds Gallop] : no gallops [Murmurs] : no murmurs [Heart Sounds Pericardial Friction Rub] : no pericardial rub [Examination Of The Chest] : the chest was normal in appearance [Chest Visual Inspection Thoracic Asymmetry] : no chest asymmetry [Diminished Respiratory Excursion] : normal chest expansion [Abnormal Walk] : normal gait [Nail Clubbing] : no clubbing  or cyanosis of the fingernails [Involuntary Movements] : no involuntary movements were seen [Skin Color & Pigmentation] : normal skin color and pigmentation [Skin Turgor] : normal skin turgor [No Focal Deficits] : no focal deficits [Oriented To Time, Place, And Person] : oriented to person, place, and time [Impaired Insight] : insight and judgment were intact [Affect] : the affect was normal [Mood] : the mood was normal [Memory Recent] : recent memory was not impaired [Memory Remote] : remote memory was not impaired [FreeTextEntry1] : Rash noted to right side of distal pole of mid sternal incision, quarter sized, non vesicular, no discharge noted. CT sites clean, dry, and intact.

## 2025-02-10 NOTE — PHYSICAL EXAM
[General Appearance - Alert] : alert [General Appearance - Well Nourished] : well nourished [General Appearance - Well-Appearing] : healthy appearing [Oriented To Time, Place, And Person] : oriented to person, place, and time [Affect] : the affect was normal [Memory Recent] : recent memory was not impaired [Person] : oriented to person [Place] : oriented to place [Time] : oriented to time [Cranial Nerves Oculomotor (III)] : extraocular motion intact [Cranial Nerves Facial (VII)] : face symmetrical [Balance] : balance was intact [2+] : Patella left 2+ [Sclera] : the sclera and conjunctiva were normal [Neck Appearance] : the appearance of the neck was normal [] : no respiratory distress [Abnormal Walk] : normal gait [Skin Color & Pigmentation] : normal skin color and pigmentation [Motor Tone] : muscle tone was normal in all four extremities [Motor Strength] : muscle strength was normal in all four extremities [Romberg's Sign] : Romberg's sign was negtive

## 2025-02-10 NOTE — HISTORY OF PRESENT ILLNESS
[FreeTextEntry1] : This is a case of a 67 yr right handed female with PMH of  HTN, HLD ascending aorta aneurysm, pituitary tumor  which was diagnosed during the work of stroke ( s/p resection in 2016 with no chemo/radiation), Hx of implanted ureters, Hx fluctuating BP related to partial obstruction [employer] -> [employer] . , stroke 2016 with mild left sided hemiparesis presents today with headaches.  Pt has had a history of headaches going back to 2016.  Pt reports headaches started about 2 days after cardiac surgery.  She was found to have Thoracic aortic aneurysm during a work up by cardiologist.   She is s/p cardiac Cath nonobstructive and  ascending hemiarch replacement with Dr. Colorado Jan 17th.   Headaches were daily initially but now are occurring every other day.   Can last 12 hrs- 24 hrs.  Located right orbital radiating to posterior parietal.  Described as pressure, throbbing. Has nausea denies vomiting.   Has photophobia and phonophobia.  Pt described an aura of "zig zag" occurring from lower left eye and across to the right eye. She also reports with the auras a "visual loss" where she looses central vision.  She does not loose peripheral.  Can last for an hr then vision returns back to baseline.    Denies any extremity weakness or slurred speech.  Denies dizziness.   Treats with cold compress and has some relief.  Has not tried any OTC for headaches.  Triggers: ?  of note: Pt with c/o concern for infection to bottom of sternal incision, pt verbalized itchy rash, denies drainage from incision. Pt verbalized temperature of  over past few days. She is still taking Cephalexin, prescription extended by PCP.   Occupation: / estate and tax  sleep: 7-9 hrs Allergies: Codeine Derivatives Compazine TABS Morphine Derivatives Penicillins Sulfa Drugs

## 2025-02-10 NOTE — ASSESSMENT
[FreeTextEntry1] : This is a case of a 67 yr right handed female with PMH of  HTN, aneurysm, pituitary tumor, stroke presents today with headaches.  Plan:    {     \\\} MRI brain- worsening HA   {     \\\} MRA H/N- new visual changes ? stroke HX   {     \\\} Trial Nurtec 75 mg    {     \\\} Follow up 2 weeks   {     \\\} f/u with  olpth - Pt has her own- Dr. kulkarni.  Will f/u with him   Headache education provided: [] Stay well hydrated [] Limit excessive caffeine and alcohol intake [] Maintain good sleep hygiene [] Try to avoid triggers [] Practice good eating habits [] Avoid excessive use of over the counter pain medications, as they can cause medication overuse headaches  [] Keep a headache diary [] Relaxation techniques, biofeedback, massage therapy, acupunctures, and heating pads may be effective  The above plan was discussed with Charlotte Correa in great detail. Charlotte Correa verbalized understanding and agrees with plan as detailed above. Patient was provided education and counselling on current diagnosis/symptoms. She was advised to call our clinic at [phone] for any new or worsening symptoms, or with any questions or concerns. In case of acute onset of neurological symptoms or worsening presentation, patient was advised to present to nearest emergency room for further evaluation. Charlotte Correa expressed understanding and all her questions/concerns were addressed.

## 2025-02-10 NOTE — ASSESSMENT
[FreeTextEntry1] : This is a case of a 67 yr right handed female with PMH of  HTN, aneurysm, pituitary tumor, stroke presents today with headaches.  Plan:    {     \\\} MRI brain- worsening HA   {     \\\} MRA H/N- new visual changes ? stroke HX   {     \\\} Trial Nurtec 75 mg    {     \\\} Follow up 2 weeks   {     \\\} f/u with  olpth - Pt has her own- Dr. kulkarni.  Will f/u with him   Headache education provided: [] Stay well hydrated [] Limit excessive caffeine and alcohol intake [] Maintain good sleep hygiene [] Try to avoid triggers [] Practice good eating habits [] Avoid excessive use of over the counter pain medications, as they can cause medication overuse headaches  [] Keep a headache diary [] Relaxation techniques, biofeedback, massage therapy, acupunctures, and heating pads may be effective  The above plan was discussed with Charlotte Correa in great detail. Charlotte Correa verbalized understanding and agrees with plan as detailed above. Patient was provided education and counselling on current diagnosis/symptoms. She was advised to call our clinic at [phone] for any new or worsening symptoms, or with any questions or concerns. In case of acute onset of neurological symptoms or worsening presentation, patient was advised to present to nearest emergency room for further evaluation. Charlotte Correa expressed understanding and all her questions/concerns were addressed.

## 2025-02-13 NOTE — ASSESSMENT
[FreeTextEntry1] : Ms. JUVENCIO VO, 67 year old female, with PMHx of HTN, HLD, Bigeminy, Stroke in 2016 with mild left sided hemiparesis, pituitary adenoma presented to her PCP for a routine work up, was found to have bigeminy on her EKG and subsequently referred to a cardiologist. During her work up, TTE revealed enlarged aorta for which she was then referred to Dr. Denis Joseph for initial evaluation and management for thoracic aortic aneurysm.   S/p Ascending hemiarch replacement on 1/17/2025   ******************************************************************************************************************************************************************************************************************************************************************************************************************************************************************************************************************************************************************************************************************************************************************************************************************************************************************************************************************************************************************************************************************   67F h/o HTN, HLD who presents today for initial evaluation regarding her incidentally found aortic aneurysm on routine surveillance TTE. No previous TTE or CT imaging to compare to. TTE on 1/8/2025 revealed ~57mm ascending aortic aneurysm w/bicuspid aortic valve. Normal biventricular function w/mild aortic stenosis and trace to no aortic insufficiency. Currently denies any chest / back pain or palpitations. Denies dyspnea at rest or upon exertion. No known family history of aortic aneurysmal disease, cerebral aneurysm disease or acute aortic syndrome.   - We are extremely pleased with the functional recovery of Ms. Vo. She is back to work and functioning well. Her chest incisions are pristine. She has no signs of lower extremity edema off diuretics.. Her exercise capacity has gradually improved. Her appetite is back to baseline. She is breathing well at rest and on exertion.   She no longer has any fevers. Her contact / dermatitis to the adhesive is resolved as well. She does still have the migraines / headaches since surgery, although has been less frequent lately. She saw a neurologist, without any clinical findings, but will be obtaining an MRI given her history of stroke and pituitary adenoma.   1. Ms. Vo looks excellent. We will see her back in 6 months with a CTA of the chest for routine surveillance. If all is stable, we will continue yearly thereafter.   2. Her heart rate has been ~80s-90s, which has been annoying her, although she would prefer to wait it out until she sees Dr. Joseph, as she would prefer to avoid any beta-blockade or medications at the moment, which is reasonable.   3. She should continue to follow up with her PCP as well as her cardiologist [Dr. Denis Joseph], I will relay my findings to Dr. Joseph.

## 2025-02-13 NOTE — END OF VISIT
[FreeTextEntry3] :  I, MARBELLA Sloan , personally performed the evaluation and management (E/M) services for this established  patient. That E/M includes conducting the initial examination, assessing all conditions, and establishing the plan of care. Today, ASHLY OLIVIA  was here to observe my evaluation and management services for this patient.

## 2025-02-13 NOTE — REASON FOR VISIT
[de-identified] :  Ascending aortic replacement and transverse aortic hemiarch replacement under deep hypothermic circulatory arrest with retrograde cerebral perfusion  using a 30 mm Dacron Ante Trey graft for enlarging ascending aortic aneurysm. [de-identified] : 1/17/2025

## 2025-02-13 NOTE — REASON FOR VISIT
[de-identified] :  Ascending aortic replacement and transverse aortic hemiarch replacement under deep hypothermic circulatory arrest with retrograde cerebral perfusion  using a 30 mm Dacron Ante Trey graft for enlarging ascending aortic aneurysm. [de-identified] : 1/17/2025

## 2025-02-13 NOTE — CONSULT LETTER
[Dear  ___] : Dear  [unfilled], [Courtesy Letter:] : I had the pleasure of seeing your patient, [unfilled], in my office today. [Please see my note below.] : Please see my note below. [Consult Closing:] : Thank you very much for allowing me to participate in the care of this patient.  If you have any questions, please do not hesitate to contact me. [Sincerely,] : Sincerely, [FreeTextEntry2] :  Dr. Denis Joseph [FreeTextEntry3] :  Dr. Akira Colorado Attending Surgeon Department of Cardiovascular and Thoracic surgery [address] [place] [address] Tel: [phone]

## 2025-02-13 NOTE — CONSULT LETTER
[Dear  ___] : Dear  [unfilled], [Courtesy Letter:] : I had the pleasure of seeing your patient, [unfilled], in my office today. [Please see my note below.] : Please see my note below. [Consult Closing:] : Thank you very much for allowing me to participate in the care of this patient.  If you have any questions, please do not hesitate to contact me. [Sincerely,] : Sincerely, [FreeTextEntry2] :  Dr. Denis Joseph [FreeTextEntry3] :  Dr. Akira Colorado Attending Surgeon Department of Cardiovascular and Thoracic surgery [address] [place] [address] Tel: [phone]

## 2025-02-28 NOTE — HISTORY OF PRESENT ILLNESS
[FreeTextEntry1] : She is a 67 year old right handed lady.  She has a PMH of HTN, HLD ascending aorta aneurysm, pituitary tumor which was diagnosed during stroke work up( s/p resection in 2016 with no chemo/radiation), Hx of implanted ureters, Hx fluctuating BP related to partial obstruction UPJ -> [place] . , stroke 2016.  She presents to the office today.  She was evaluated by NP Amy Bedoya for headaches after her ascending aorta aneurysm repair.  She had an MRI brain which incidentally demonstrated a right parietal cavernous malformation.  Her headaches have since resolved and she denies any focal neurologic symptoms.  MRI Brain 2/13/25: Chronic bilateral basal ganglia/corona radiata lacunar infarctions. Scattered periventricular and subcortical white matter T2 /FLAIR hyperintensities are seen without mass effect, nonspecific, likely representing mild to moderate chronic microvascular changes.  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 Additional smaller susceptibility artifacts in the right frontal lobe and left parietal lobe on images 59 and 63 of series 16. Differential diagnosis includes cavernomas, chronic hypertensive microhemorrhages, and amyloid angiopathy.   Dr. Margo Colorado

## 2025-02-28 NOTE — DISCUSSION/SUMMARY
[FreeTextEntry1] : She has mildly clumsy fine finger movements on the left, a sequela of her stroke in 2016, and is otherwise neurologically intact.  To summarize, in 2016, she developed confusion and left hemiparesis, from a right basal ganglia infarct, with a mechanism consistent with small vessel disease.  In 1/2025, she had an aortic aneurysm repaired, and afterwards developed headaches.  She had an MRI brain which incidentally demonstrated evidence of microhemorrhages, which were both lobar and deep, and could be representative of effects of chronic hypertension and perhaps amyloid angiopathy.  Additionally, a cavernous malformation in the right parietal lobe was also detected; this is an incidental/asymptomatic finding.  - She has not been taking aspirin since her stroke. I will discuss risks/benefits of ASA for secondary stroke prevention, in light of possible cerebral amyloid angiopathy and cavernous malformation and will then call to update her. - She should benefit from aggressive vascular risk factor control, with emphasis on blood pressure.  Of note, more recently,  her blood pressure has been very well-controlled while off of antihypertensives. - If she develops any focal neurologic symptoms, she should present to the ED for emergent imaging. - We discussed that there is no role for routine follow-up of cavernous malformations.  Nonetheless, given her complex medical history, she requests to have serial scans to monitor this finding for stability.  This can be done annually; next scan to be completed in 02/2026.  She can follow-up in 6 months with Dopplers.  Her extremities free of further serious trouble.

## 2025-02-28 NOTE — PHYSICAL EXAM
[General Appearance - Alert] : alert [General Appearance - In No Acute Distress] : in no acute distress [Sclera] : the sclera and conjunctiva were normal [Hearing Threshold Finger Rub Not [place]] : hearing was normal [Jugular Venous Distention Increased] : there was no jugular-venous distention [Thyroid Diffuse Enlargement] : the thyroid was not enlarged [Auscultation Breath Sounds / Voice Sounds] : lungs were clear to auscultation bilaterally [Heart Sounds] : normal S1 and S2 [Systolic grade ___/6] : A grade [unfilled]/6 systolic murmur was heard. [Edema] : there was no peripheral edema [Abdomen Tenderness] : non-tender [Urinary Bladder Findings] : the bladder was normal on palpation [No Spinal Tenderness] : no spinal tenderness [Abnormal Walk] : normal gait [Skin Color & Pigmentation] : normal skin color and pigmentation [] : no rash [No Focal Deficits] : no focal deficits [Oriented To Time, Place, And Person] : oriented to person, place, and time [FreeTextEntry1] : No Valentino's sign

## 2025-02-28 NOTE — ASSESSMENT
[FreeTextEntry1] : 1. Presenting complaints typical for acute UTI.  I was made aware that there is an association between Cipro use and aortic aneurysms (VERA).  The patient states that there is history of aortic aneurysm on both side of the family. At this point will use Cefdnir instead of Cipro. Once the sensitivity is back will target the organism w/ the appropriate antibiotic.  Will call when results of urine culture and sensitivity is back.

## 2025-02-28 NOTE — CONSULT LETTER
[Dear  ___] : Dear ~RAKAN, [Consult Letter:] : I had the pleasure of evaluating your patient, [unfilled]. [Please see my note below.] : Please see my note below. [Consult Closing:] : Thank you very much for allowing me to participate in the care of this patient.  If you have any questions, please do not hesitate to contact me. [Sincerely,] : Sincerely, [DrJuwan  ___] : Dr. DANIELS [DrJuwan ___] : Dr. DANIELS [FreeTextEntry2] : Amy Bedoya, NP [FreeTextEntry3] : Shelby Davidson, FNP-BC

## 2025-02-28 NOTE — ASSESSMENT
[FreeTextEntry1] : 1. Post-op acute cholecystitis: resolved. Check LFTs. 2. Cavernoma: vascular neurosurgery consult in the near future. Headache has subsided. 3. Hypertension: BP normal w/o meds. Will call with results of LFTs. Return in 6 months.

## 2025-02-28 NOTE — HISTORY OF PRESENT ILLNESS
[FreeTextEntry1] : The patient woke up at 4AM w/ stabbing bladder pain and urinary frequency. Had one episode of urinary incontinence. No chills no fever.  Here for evaluation and antibiotic treatment of likely UTI.

## 2025-02-28 NOTE — DISCUSSION/SUMMARY
PATIENT ALERT TALKING SITTING UP IN BED EATING BREAKFAST NO C/O CHESTPAIN OF
OR DISCOMFORT.CALL LIGHT WITHIN REACH.CC [FreeTextEntry1] : In summary   Rizwan, 67 year old with a past medical history of Hypertension, Hyperlipidemia =============== =============== Bigeminy During Virus - Reassured   HTN - Losartan  Night sweats +Fever  Check CXR UA BCxs Incision site and as per CTS  Cholesterol management - Assessed - Impression is active; changes as per above - Discussed diet and exercise at length - Any lifestyle/medication changes as per above   Risk factors for cardiomyopathy - Assessed - Impression is stable   Cardiac Health optimization - Discussed diet and exercise at length - Discussed importance of monitoring and re-assessment of cardiac health on further visits      Ortega, kind thanks for the referral.   Yang Joseph MD [facility] RENAY JOHNSON Director, Preventive Cardiology [facility]                                                                                                                                                                                                                                                                     --                                                                                                                                                                                                                                                                                                                                                                                                                                                                                                                                                                                                                Cholesterol management - Assessed - Impression is active; changes as per above - Discussed diet and exercise at length - Any lifestyle/medication changes as per above Risk factors for cardiomyopathy - Assessed - Impression is stable - Reviewed records from PCP BP - Assessed - Impression is active Cardiac Health optimization - Discussed diet and exercise at length - Discussed importance of monitoring and re-assessment of cardiac health on further visits

## 2025-02-28 NOTE — PHYSICAL EXAM
[General Appearance - Alert] : alert [General Appearance - In No Acute Distress] : in no acute distress [Sclera] : the sclera and conjunctiva were normal [Hearing Threshold Finger Rub Not [place]] : hearing was normal [Jugular Venous Distention Increased] : there was no jugular-venous distention [Thyroid Diffuse Enlargement] : the thyroid was not enlarged [Auscultation Breath Sounds / Voice Sounds] : lungs were clear to auscultation bilaterally [Heart Sounds] : normal S1 and S2 [Systolic grade ___/6] : A grade [unfilled]/6 systolic murmur was heard. [Edema] : there was no peripheral edema [Abdomen Tenderness] : non-tender [Urinary Bladder Findings] : the bladder was normal on palpation [No Spinal Tenderness] : no spinal tenderness [Abnormal Walk] : normal gait [Skin Color & Pigmentation] : normal skin color and pigmentation [] : no rash [No Focal Deficits] : no focal deficits [Oriented To Time, Place, And Person] : oriented to person, place, and time [FreeTextEntry1] : No Valentino's sign

## 2025-02-28 NOTE — PHYSICAL EXAM
[General Appearance - Alert] : alert [General Appearance - In No Acute Distress] : in no acute distress [Impaired Insight] : insight and judgment were intact [Affect] : the affect was normal [Sclera] : the sclera and conjunctiva were normal [Full Visual Field] : full visual field [Outer Ear] : the ears and nose were normal in appearance [Examination Of The Oral Cavity] : the lips and gums were normal [Neck Appearance] : the appearance of the neck was normal [Neck Cervical Mass (___cm)] : no neck mass was observed [Auscultation Breath Sounds / Voice Sounds] : lungs were clear to auscultation bilaterally [Heart Sounds] : normal S1 and S2 [Abnormal Walk] : normal gait [Nail Clubbing] : no clubbing  or cyanosis of the fingernails [Musculoskeletal - Swelling] : no joint swelling seen [Motor Tone] : muscle strength and tone were normal [Skin Color & Pigmentation] : normal skin color and pigmentation [Skin Turgor] : normal skin turgor [] : no rash [FreeTextEntry1] :  Neurologic examination:  Mental status:  The patient is alert, attentive, and oriented. Speech is clear and fluent with good comprehension.  Cranial nerves:  CN II: Visual fields are full to confrontation.   CN III, IV, VI: At primary gaze, there is no eye deviation.EOMI. No ptosis  CN V: Facial sensation is intact bilaterally.  CN VII: Face is symmetric with normal eye closure and smile.  CN VII: Hearing is grossly intact.  CN IX, X: Palate elevates symmetrically. Phonation is normal.  CN XI: Head turning and shoulder shrug are intact  CN XII: Tongue is midline with normal movements and no atrophy.  Motor:  There is no pronator drift of out-stretched arms. Muscle bulk and tone are normal. Strength is full bilaterally.  Sensory:  Light touch intact in fingers and toes.  Coordination:  Fine finger movements are mildly clumsy on the left.  There is no dysmetria on finger-to-nose.   Gait/Stance:  Gait and tandem gait are normal.  Romberg is absent.

## 2025-02-28 NOTE — PHYSICAL EXAM
[Well Developed] : well developed [Well Nourished] : well nourished [No Acute Distress] : no acute distress [Normal Conjunctiva] : normal conjunctiva [Normal Venous Pressure] : normal venous pressure [No Carotid Bruit] : no carotid bruit [Normal S1, S2] : normal S1, S2 [No Rub] : no rub [No Gallop] : no gallop [Clear Lung Fields] : clear lung fields [Good Air Entry] : good air entry [No Respiratory Distress] : no respiratory distress  [Soft] : abdomen soft [Non Tender] : non-tender [No Masses/organomegaly] : no masses/organomegaly [Normal Bowel Sounds] : normal bowel sounds [Normal Gait] : normal gait [No Edema] : no edema [No Cyanosis] : no cyanosis [No Clubbing] : no clubbing [No Varicosities] : no varicosities [No Rash] : no rash [No Skin Lesions] : no skin lesions [Moves all extremities] : moves all extremities [No Focal Deficits] : no focal deficits [Normal Speech] : normal speech [Alert and Oriented] : alert and oriented [Normal memory] : normal memory [de-identified] : +Sternotomy

## 2025-02-28 NOTE — PHYSICAL EXAM
[Well Developed] : well developed [Well Nourished] : well nourished [No Acute Distress] : no acute distress [Normal Conjunctiva] : normal conjunctiva [Normal Venous Pressure] : normal venous pressure [No Carotid Bruit] : no carotid bruit [Normal S1, S2] : normal S1, S2 [No Rub] : no rub [No Gallop] : no gallop [Clear Lung Fields] : clear lung fields [Good Air Entry] : good air entry [No Respiratory Distress] : no respiratory distress  [Soft] : abdomen soft [Non Tender] : non-tender [No Masses/organomegaly] : no masses/organomegaly [Normal Bowel Sounds] : normal bowel sounds [Normal Gait] : normal gait [No Edema] : no edema [No Cyanosis] : no cyanosis [No Clubbing] : no clubbing [No Varicosities] : no varicosities [No Rash] : no rash [No Skin Lesions] : no skin lesions [Moves all extremities] : moves all extremities [No Focal Deficits] : no focal deficits [Normal Speech] : normal speech [Alert and Oriented] : alert and oriented [Normal memory] : normal memory [de-identified] : +Sternotomy

## 2025-02-28 NOTE — PHYSICAL EXAM
[General Appearance - Alert] : alert [General Appearance - In No Acute Distress] : in no acute distress [Sclera] : the sclera and conjunctiva were normal [Hearing Threshold Finger Rub Not [place]] : hearing was normal [Jugular Venous Distention Increased] : there was no jugular-venous distention [Thyroid Diffuse Enlargement] : the thyroid was not enlarged [Auscultation Breath Sounds / Voice Sounds] : lungs were clear to auscultation bilaterally [Heart Sounds] : normal S1 and S2 [Systolic grade ___/6] : A grade [unfilled]/6 systolic murmur was heard. [FreeTextEntry1] : S/P aortic aneurysm repair [Edema] : there was no peripheral edema [Abdomen Tenderness] : non-tender [Urinary Bladder Findings] : the bladder was normal on palpation [No CVA Tenderness] : no ~M costovertebral angle tenderness [No Spinal Tenderness] : no spinal tenderness [Abnormal Walk] : normal gait [Skin Color & Pigmentation] : normal skin color and pigmentation [] : no rash [No Focal Deficits] : no focal deficits [Oriented To Time, Place, And Person] : oriented to person, place, and time

## 2025-02-28 NOTE — REASON FOR VISIT
[Follow-Up] : a follow-up visit [FreeTextEntry1] : For acute urinary tract symptoms starting this morning at 4AM.

## 2025-02-28 NOTE — HISTORY OF PRESENT ILLNESS
[FreeTextEntry1] : Dear Ortega,   I had the pleasure of seeing your patient JUVENCIO VO for Cardiovascular evaluation.   As you know, she  is a Pleasant, 67 year old with a past medical history of Hypertension, Hyperlipidemia =============== =============== Bigeminy During Virus - Reassured   HTN - Losartan  CC: F/U Heart Issues - Notes No CP/SOB/Palps/Leg swelling - Reports No F/C/N/V/Headaches - Reports Normal Exercise Tolerance - Reports no medication changes - Reports normal mood/quality of life - Reports no diet changes - Reports no body aches - Reports no recent colds/viruses - Recent labs/imaging reviewed including Kane Aragon Risk Assessment for 10 Year ACC/AHA Pooled Risk Cohort Equation places this person at < 7.5% Risk of ASCVD ----------------- ----------------- 1-2025 CC: Fever and Heart Issues - Notes +CP, Fever - Reports No F/C/N/V/Headaches - Reports Normal Exercise Tolerance - Reports no medication changes - Reports normal mood/quality of life - Reports no diet changes - Reports no body aches - Reports no recent colds/viruses - Recent labs/imaging reviewed - Relevant Family history reviewed Mother/Father - CV Night sweats +Fever  Check CXR UA BCxs Incision site and as per CTS ----------------- ----------------- 2-2025 CC: Heart Issues - Patient reports no chest pain that radiates, no localization to the sternum, no radiation to the neck/jaw, no alleviating nor worsening precipitants to CP, no assoc symptoms to CP - Patient notes no SOB/Palps/Leg swelling - Reports No F/C/N/V/Headaches - Reports Normal Exercise Tolerance - Reports no medication changes - Reports normal mood/quality of life - Reports no diet changes - Reports no body aches - Reports no recent colds/viruses - Recent labs/imaging reviewed - Relevant Family history reviewed Mother/Father - CV Risk Assessment for 10 Year ACC/AHA Pooled Risk Cohort Equation places this person at < 7.5% Risk of ASCVD TTE -- 2-2025 - LVEF  Nl;  S/p Ao Root Repair ; Bicusp;  Mild to Mod AI; Mild AS; S/p Ao Root Repair

## 2025-02-28 NOTE — DISCUSSION/SUMMARY
[FreeTextEntry1] : In summary   Rizwan, 67 year old with a past medical history of Hypertension, Hyperlipidemia =============== =============== Bigeminy During Virus - Reassured   HTN - Losartan  Night sweats +Fever  Check CXR UA BCxs Incision site and as per CTS  Cholesterol management - Assessed - Impression is active; changes as per above - Discussed diet and exercise at length - Any lifestyle/medication changes as per above   Risk factors for cardiomyopathy - Assessed - Impression is stable   Cardiac Health optimization - Discussed diet and exercise at length - Discussed importance of monitoring and re-assessment of cardiac health on further visits      Ortega, kind thanks for the referral.   Yang Joseph MD [facility] RENAY JOHNSON Director, Preventive Cardiology [facility]                                                                                                                                                                                                                                                                     --                                                                                                                                                                                                                                                                                                                                                                                                                                                                                                                                                                                                                Cholesterol management - Assessed - Impression is active; changes as per above - Discussed diet and exercise at length - Any lifestyle/medication changes as per above Risk factors for cardiomyopathy - Assessed - Impression is stable - Reviewed records from PCP BP - Assessed - Impression is active Cardiac Health optimization - Discussed diet and exercise at length - Discussed importance of monitoring and re-assessment of cardiac health on further visits

## 2025-02-28 NOTE — HISTORY OF PRESENT ILLNESS
[FreeTextEntry1] : Abdominal pain and fever have resolved. Her Alk Phosphatase was lower.  Feels well and headaches have disappeared. Had a brain MRI order by Neuro NP and the test showed a possible vascular malformation (Cavernoma). Referred to vascular neurosurgery in the near future. No complaints.

## 2025-03-26 NOTE — HISTORY OF PRESENT ILLNESS
[FreeTextEntry1] : Charlotte Correa is a 67 year old right handed female presenting today for initial consultation for migraine with aura  Headache Age of onset: 67- following surgery 1/17/2025 Recent abdominal aortic arch replacement, 2 days after surgery developed migraine with aura. Was having daily for about 2 weeks. Now HA are once a week  Location: [place]  Description: Pulsating, throbbing, stabbing  Nausea, sometimes vomiting + Light or sound sensitivity Warning/aura (premonitory phase)- Zig zag lines from R visual field to L eye. Loses central vision, peripheral intact. Lasts for about 30 min to an hour Postdrome: fatigue Triggers: Light  Nocturnal awakening- no Associated with exertion or Valsalva- no  Once headache starts- duration: few hours to 24 hours With medication: Tylenol 1000 mg doesn't completely resolve Without medication: up to 24 hrs  Average number of headache days per week: 1 per week   Sleep pattern: 7-8 hrs per night Water intake: 2 L Caffeine intake: 1 cup coffee in AM Anxiety/depression/stress: no  Family History of headaches: no  Personal history of head trauma: no  Preventatives tried: none Rescue tried: none  Social-  Works as an   Denies smoking/vaping. Denies illicit drug use. Denies alcohol consumption Supplements- magnesium glycinate, calcium, B vitamin liquid, Nattokinase (pt stated taking as alternative to ASA daily) Exercise- walking and cycling daily   Medical history: Stroke 2016 not on daily ASA, HTN (not currently on medication, off for 6 months due to well controlled BP), HLD (not on medications), kidney stone 3/2025, hx of UPJ obstruction and RA stenosis  Family hx: Abdominal aortic aneurysms  Previously was on betablockers, ACE, CCB's for BP control

## 2025-03-26 NOTE — HISTORY OF PRESENT ILLNESS
[FreeTextEntry1] : Charlotte Correa is a 67 year old right handed female presenting today for initial consultation for migraine with aura  Headache Age of onset: 67- following surgery 1/17/2025 Recent abdominal aortic arch replacement, 2 days after surgery developed migraine with aura. Was having daily for about 2 weeks. Now HA are once a week  Location: [place]  Description: Pulsating, throbbing, stabbing  Nausea, sometimes vomiting + Light or sound sensitivity Warning/aura (premonitory phase)- Zig zag lines from R visual field to L eye. Loses central vision, peripheral intact. Lasts for about 30 min to an hour Postdrome: fatigue Triggers: Light  Nocturnal awakening- no Associated with exertion or Valsalva- no  Once headache starts- duration: few hours to 24 hours With medication: Tylenol 1000 mg doesn't completely resolve Without medication: up to 24 hrs  Average number of headache days per week: 1 per week   Sleep pattern: 7-8 hrs per night Water intake: 2 L Caffeine intake: 1 cup coffee in AM Anxiety/depression/stress: no  Family History of headaches: no  Personal history of head trauma: no  Preventatives tried: none Rescue tried: none  Social-  Works as an   Denies smoking/vaping. Denies illicit drug use. Denies alcohol consumption Supplements- magnesium glycinate, calcium, B vitamin liquid, Nattokinase (pt stated taking as alternative to ASA daily) Exercise- walking and cycling daily   Medical history: Stroke 2016 not on daily ASA, HTN (not currently on medication, off for 6 months due to well controlled BP), HLD (not on medications), kidney stone 3/2025, hx of UPJ obstruction and RA stenosis  Family hx: Abdominal aortic aneurysms  Previously was on betablockers, ACE, CCB's for BP control

## 2025-03-26 NOTE — CONSULT LETTER
[Dear  ___] : Dear  [unfilled], [Consult Letter:] : I had the pleasure of evaluating your patient, [unfilled]. [Please see my note below.] : Please see my note below. [FreeTextEntry3] : Sincerely,  Lily Parisi M.D.

## 2025-03-26 NOTE — ASSESSMENT
[FreeTextEntry1] : Charlotte Correa is a 67 year old right handed female presenting today for initial consultation for chronic migraine with aura  Patient has a history of stroke in 2016. Currently not on ASA daily for secondary stroke prevention. We discussed in detail as we recommend taking this, however, patient would like to consult with her Nephrology/PCP and Cardiothoracic team as she was previously told it was not necessary.  - Pt has never had any MRA Head imaging, given hx of stroke and headaches would like to obtain this imaging. Ordered MRA Head today - BP at goal today. Continue to monitor BP. Goal <130/80 - Continue to monitor LDL, goal <70. Currently not on statin - Continue daily exercise   Headaches - Nurtec PRN take at onset of migraine +/- tylenol  **Patient with history of stroke not on daily ASA, would not consider triptan for abortive** - Maintain HA diary - Stay hydrated at least 64 oz water daily  - Follow up in 6 months with Dr. Parisi    For brain health - healthy eating/diet for memory cognition with a diet rich in fiber, fruits and vegetables and low in saturated fats, processed foods. - good sleep, 7-8 hours a night. No use of the phone or watching television before bed. - aerobic exercise, at least 30 minutes, such as a brisk walk 3-4 times a week. - Hydrate with at least 64 oz of water daily - keep your mind active with puzzles, reading, and socializing with others. - abstaining from excess alcohol, drug use. - blood pressure and cholesterol monitoring, blood glucose monitoring to prevent microvascular disease which can lead to cognitive impairment.

## 2025-03-26 NOTE — DATA REVIEWED
[de-identified] : XACOREY: [id number] - MR BRAIN  - ORDERED BY:  AAMIR YOHANNES   PROCEDURE DATE:  02/13/2025  INTERPRETATION:  CLINICAL INDICATIONS: worsening HA and aura;  COMPARISON: None.  TECHNIQUE: MRI brain: Multiplanar, multisequence MR imaging of the brain are obtained without the administration of intravenous Gadavist contrast.  FINDINGS: Chronic bilateral basal ganglia/corona radiata lacunar infarctions. Enlarged partially empty sella. There is no abnormal restricted diffusion to suggest acute infarction. Scattered periventricular and subcortical white matter T2 /FLAIR hyperintensities are seen without mass effect, nonspecific, likely representing mild to moderate chronic microvascular changes. Normal T2 flow-voids are seen within  the intracranial vasculature. The lateral ventricles and cortical sulci are age-appropriate in size and configuration. There is no mass, mass effect, or extra-axial fluid collection. 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 Additional smaller susceptibility artifacts in the right frontal lobe and left parietal lobe on images 59 and 63 of series 16. Differential diagnosis includes cavernomas, chronic hypertensive microhemorrhages, and amyloid angiopathy.  Midline structures are normal.  The patient is status post bilateral ocular lens replacement surgery. The visualized paranasal sinuses, mastoid air cells and orbits are otherwise unremarkable.   IMPRESSION: No acute infarction. Chronic microvascular changes.  --- End of Report ---

## 2025-03-26 NOTE — DATA REVIEWED
[de-identified] : XACOREY: [id number] - MR BRAIN  - ORDERED BY:  AAMIR YOHANNES   PROCEDURE DATE:  02/13/2025  INTERPRETATION:  CLINICAL INDICATIONS: worsening HA and aura;  COMPARISON: None.  TECHNIQUE: MRI brain: Multiplanar, multisequence MR imaging of the brain are obtained without the administration of intravenous Gadavist contrast.  FINDINGS: Chronic bilateral basal ganglia/corona radiata lacunar infarctions. Enlarged partially empty sella. There is no abnormal restricted diffusion to suggest acute infarction. Scattered periventricular and subcortical white matter T2 /FLAIR hyperintensities are seen without mass effect, nonspecific, likely representing mild to moderate chronic microvascular changes. Normal T2 flow-voids are seen within  the intracranial vasculature. The lateral ventricles and cortical sulci are age-appropriate in size and configuration. There is no mass, mass effect, or extra-axial fluid collection. 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 Additional smaller susceptibility artifacts in the right frontal lobe and left parietal lobe on images 59 and 63 of series 16. Differential diagnosis includes cavernomas, chronic hypertensive microhemorrhages, and amyloid angiopathy.  Midline structures are normal.  The patient is status post bilateral ocular lens replacement surgery. The visualized paranasal sinuses, mastoid air cells and orbits are otherwise unremarkable.   IMPRESSION: No acute infarction. Chronic microvascular changes.  --- End of Report ---

## 2025-03-26 NOTE — HISTORY OF PRESENT ILLNESS
[FreeTextEntry1] : Charlotte Correa is a 67 year old right handed female presenting today for initial consultation for migraine with aura  Headache Age of onset: 67- following surgery 1/17/2025 Recent abdominal aortic arch replacement, 2 days after surgery developed migraine with aura. Was having daily for about 2 weeks. Now HA are once a week  Location: [place]  Description: Pulsating, throbbing, stabbing  Nausea, sometimes vomiting + Light or sound sensitivity Warning/aura (premonitory phase)- Zig zag lines from R visual field to L eye. Loses central vision, peripheral intact. Lasts for about 30 min to an hour Postdrome: fatigue Triggers: Light  Nocturnal awakening- no Associated with exertion or Valsalva- no  Once headache starts- duration: few hours to 24 hours With medication: Tylenol 1000 mg doesn't completely resolve Without medication: up to 24 hrs  Average number of headache days per week: 1 per week   Sleep pattern: 7-8 hrs per night Water intake: 2 L Caffeine intake: 1 cup coffee in AM Anxiety/depression/stress: no  Family History of headaches: no  Personal history of head trauma: no  Preventatives tried: none Rescue tried: none  Social-  Works as an   Denies smoking/vaping. Denies illicit drug use. Denies alcohol consumption Supplements- magnesium glycinate, calcium, B vitamin liquid, Nattokinase (pt stated taking as alternative to ASA daily) Exercise- walking and cycling daily   Medical history: Stroke 2016 not on daily ASA, HTN (not currently on medication, off for 6 months due to well controlled BP), HLD (not on medications), kidney stone 3/2025, hx of UPJ obstruction and RA stenosis  Family hx: Abdominal aortic aneurysms  Previously was on betablockers, ACE, CCB's for BP control

## 2025-03-26 NOTE — PHYSICAL EXAM
[FreeTextEntry1] : Physical examination  General: No acute distress, Awake, Alert.   Mental status  Awake, alert, and oriented to person, time and place, Normal attention span and concentration, Recent and remote memory intact, Language intact, Fund of knowledge intact.  Cranial Nerves  II: VFF  III, IV, VI: PERRL, EOMI.      Left end gaze nystagmus noted, b/l surgical pupils V: Facial sensation is normal B/L.  VII: Facial strength is normal B/L.  VIII: Gross hearing is intact.  IX, X: Palate is midline and elevates symmetrically.  XI: Trapezius normal strength.  XII: Tongue midline without atrophy or fasciculations.   Motor exam  Muscle tone - no evidence of rigidity or resistance in all 4 extremities.       No atrophy or fasciculations  Muscle Strength: arms and legs, proximal and distal flexors and extensors are normal    L hand grasp 4+/5 No UE drift.  Reflexes  All present, normal, and symmetrical.  Plantars right: mute.  Plantars left: mute.   Coordination  Finger to nose: Normal.  Heel to shin: Normal.   Sensory  Intact sensation to vibration and cold.  Gait  Normal

## 2025-03-26 NOTE — DATA REVIEWED
[de-identified] : XACOREY: [id number] - MR BRAIN  - ORDERED BY:  AAMIR YOHANNES   PROCEDURE DATE:  02/13/2025  INTERPRETATION:  CLINICAL INDICATIONS: worsening HA and aura;  COMPARISON: None.  TECHNIQUE: MRI brain: Multiplanar, multisequence MR imaging of the brain are obtained without the administration of intravenous Gadavist contrast.  FINDINGS: Chronic bilateral basal ganglia/corona radiata lacunar infarctions. Enlarged partially empty sella. There is no abnormal restricted diffusion to suggest acute infarction. Scattered periventricular and subcortical white matter T2 /FLAIR hyperintensities are seen without mass effect, nonspecific, likely representing mild to moderate chronic microvascular changes. Normal T2 flow-voids are seen within  the intracranial vasculature. The lateral ventricles and cortical sulci are age-appropriate in size and configuration. There is no mass, mass effect, or extra-axial fluid collection. 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 Additional smaller susceptibility artifacts in the right frontal lobe and left parietal lobe on images 59 and 63 of series 16. Differential diagnosis includes cavernomas, chronic hypertensive microhemorrhages, and amyloid angiopathy.  Midline structures are normal.  The patient is status post bilateral ocular lens replacement surgery. The visualized paranasal sinuses, mastoid air cells and orbits are otherwise unremarkable.   IMPRESSION: No acute infarction. Chronic microvascular changes.  --- End of Report ---

## 2025-04-09 NOTE — HISTORY OF PRESENT ILLNESS
How Severe Is Your Skin Lesion?: mild Have Your Skin Lesions Been Treated?: not been treated Is This A New Presentation, Or A Follow-Up?: Skin Lesions [FreeTextEntry1] : This is a case of a 67 yr right handed female with PMH of  HTN, HLD ascending aorta aneurysm, pituitary tumor  which was diagnosed during the work of stroke ( s/p resection in 2016 with no chemo/radiation), Hx of implanted ureters, Hx fluctuating BP related to partial obstruction UPJ -> [place] . , stroke 2016 with mild left sided hemiparesis presents today with headaches.  Pt has had a history of headaches going back to 2016.  Pt reports headaches started about 2 days after cardiac surgery.  She was found to have Thoracic aortic aneurysm during a work up by cardiologist.   She is s/p cardiac Cath nonobstructive and  ascending hemiarch replacement with Dr. [place] Jan 17th.   Headaches were daily initially but now are occurring every other day.   Can last 12 hrs- 24 hrs.  Located right orbital radiating to posterior parietal.  Described as pressure, throbbing. Has nausea denies vomiting.   Has photophobia and phonophobia.  Pt described an aura of "zig zag" occurring from lower left eye and across to the right eye. She also reports with the auras a "visual loss" where she looses central vision.  She does not loose peripheral.  Can last for an hr then vision returns back to baseline.    Denies any extremity weakness or slurred speech.  Denies dizziness.   Treats with cold compress and has some relief.  Has not tried any OTC for headaches.  Triggers: ?  of note: Pt with c/o concern for infection to bottom of sternal incision, pt verbalized itchy rash, denies drainage from incision. Pt verbalized temperature of  over past few days. She is still taking Cephalexin, prescription extended by PCP.   Occupation: / estate and tax  sleep: 7-9 hrs Allergies: Codeine Derivatives Compazine TABS Morphine Derivatives Penicillins Sulfa Drugs      Additional History: Notes use of neosporin with some improvement on the forearm. Burning sensation on the tops of the shoulders, notes hx of shingles in the past, did not confirm any current Tx for “rash”

## 2025-07-29 NOTE — HISTORY OF PRESENT ILLNESS
[FreeTextEntry1] : Dear Ortega,   I had the pleasure of seeing your patient JUVENCIO VO for Cardiovascular evaluation.   As you know, she  is a Pleasant, 67 year old with a past medical history of Hypertension, Hyperlipidemia, A/p Aortic Root Repair, mild to Mod AI =============== =============== Bigeminy During Virus - Reassured   HTN - Losartan  CC: F/U Heart Issues - Notes No CP/SOB/Palps/Leg swelling - Reports No F/C/N/V/Headaches - Reports Normal Exercise Tolerance - Reports no medication changes - Reports normal mood/quality of life - Reports no diet changes - Reports no body aches - Reports no recent colds/viruses - Recent labs/imaging reviewed including Kane Aragon Risk Assessment for 10 Year ACC/AHA Pooled Risk Cohort Equation places this person at < 7.5% Risk of ASCVD ----------------- ----------------- 1-2025 CC: Fever and Heart Issues - Notes +CP, Fever - Reports No F/C/N/V/Headaches - Reports Normal Exercise Tolerance - Reports no medication changes - Reports normal mood/quality of life - Reports no diet changes - Reports no body aches - Reports no recent colds/viruses - Recent labs/imaging reviewed - Relevant Family history reviewed Mother/Father - CV Night sweats +Fever  Check CXR UA BCxs Incision site and as per CTS ----------------- ----------------- 2-2025 CC: Heart Issues - Patient reports no chest pain that radiates, no localization to the sternum, no radiation to the neck/jaw, no alleviating nor worsening precipitants to CP, no assoc symptoms to CP - Patient notes no SOB/Palps/Leg swelling - Reports No F/C/N/V/Headaches - Reports Normal Exercise Tolerance - Reports no medication changes - Reports normal mood/quality of life - Reports no diet changes - Reports no body aches - Reports no recent colds/viruses - Recent labs/imaging reviewed - Relevant Family history reviewed Mother/Father - CV Risk Assessment for 10 Year ACC/AHA Pooled Risk Cohort Equation places this person at < 7.5% Risk of ASCVD TTE -- 2-2025 - LVEF  Nl;  S/p Ao Root Repair ; Bicusp;  Mild to Mod AI; Mild AS; S/p Ao Root Repair  HCTZ 25, + 5lb wt gain Torsemide PRN Ok as long as does not take HCTZ at the same time

## 2025-07-29 NOTE — PHYSICAL EXAM
[Well Developed] : well developed [Well Nourished] : well nourished [No Acute Distress] : no acute distress [Normal Conjunctiva] : normal conjunctiva [Normal Venous Pressure] : normal venous pressure [No Carotid Bruit] : no carotid bruit [Normal S1, S2] : normal S1, S2 [No Rub] : no rub [No Gallop] : no gallop [Clear Lung Fields] : clear lung fields [Good Air Entry] : good air entry [No Respiratory Distress] : no respiratory distress  [Soft] : abdomen soft [Non Tender] : non-tender [No Masses/organomegaly] : no masses/organomegaly [Normal Bowel Sounds] : normal bowel sounds [Normal Gait] : normal gait [No Edema] : no edema [No Cyanosis] : no cyanosis [No Clubbing] : no clubbing [No Varicosities] : no varicosities [No Rash] : no rash [No Skin Lesions] : no skin lesions [Moves all extremities] : moves all extremities [No Focal Deficits] : no focal deficits [Normal Speech] : normal speech [Alert and Oriented] : alert and oriented [Normal memory] : normal memory [de-identified] : +Sternotomy

## 2025-07-29 NOTE — DISCUSSION/SUMMARY
[FreeTextEntry1] : In summary   Rizwan, 67 year old with a past medical history of Hypertension, Hyperlipidemia =============== =============== Bigeminy During Virus - Reassured   HTN - Losartan  HFpEF - Salt Sensitive - Torsemide PRN Ok as long as does not take HCTZ at the same time  Night sweats +Fever  Check CXR UA BCxs Incision site and as per CTS  Cholesterol management - Assessed - Impression is active; changes as per above - Discussed diet and exercise at length - Any lifestyle/medication changes as per above   Risk factors for cardiomyopathy - Assessed - Impression is stable   Cardiac Health optimization - Discussed diet and exercise at length - Discussed importance of monitoring and re-assessment of cardiac health on further visits      Ortega, kind thanks for the referral.   Yang Joseph MD [facility] RENAY JOHNSON Director, Preventive Cardiology [facility]                                                                                                                                                                                                                                                                     --                                                                                                                                                                                                                                                                                                                                                                                                                                                                                                                                                                                                                Cholesterol management - Assessed - Impression is active; changes as per above - Discussed diet and exercise at length - Any lifestyle/medication changes as per above Risk factors for cardiomyopathy - Assessed - Impression is stable - Reviewed records from PCP BP - Assessed - Impression is active Cardiac Health optimization - Discussed diet and exercise at length - Discussed importance of monitoring and re-assessment of cardiac health on further visits